# Patient Record
Sex: FEMALE | Race: WHITE | NOT HISPANIC OR LATINO | Employment: STUDENT | URBAN - METROPOLITAN AREA
[De-identification: names, ages, dates, MRNs, and addresses within clinical notes are randomized per-mention and may not be internally consistent; named-entity substitution may affect disease eponyms.]

---
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
## 2021-05-30 VITALS — WEIGHT: 110.2 LBS

## 2021-05-31 VITALS — WEIGHT: 107.3 LBS | BODY MASS INDEX: 19.75 KG/M2 | HEIGHT: 62 IN

## 2021-05-31 NOTE — PROGRESS NOTES
Family Medicine Office Visit  [facility]  Patient Name: Marie Bob  Patient Age: 19 y.o.  YOB: 2000  MRN: [id number]    Date of Visit: 2019  Reason for Office Visit:   Chief Complaint   Patient presents with     Depo Shot     late in for the next depo shot            Assessment / Plan / Medical Decision Makin. Menorrhagia with irregular cycle  Discussed different contraception options with patient. Patient interested in IUD and I agree, as patient has trouble remembering to come in for appointments/take pills on time. Depo-provera injection given as patient could not get appointment for IUD placement until September. She will schedule appointment for consult and IUD placement. Recommended starting a women's multivitamin with iron due to frequent heavy bleeding. Iron checked on  was normal and patient is not currently having any symptoms of anemia.  - Pregnancy, Urine  - medroxyPROGESTERone injection 150 mg (DEPO-PROVERA)  - Ambulatory referral to Internal Medicine      Health Maintenance Review  Health Maintenance   Topic Date Due     HIV SCREENING  2016     PREVENTIVE CARE VISIT  2018     ADVANCE CARE PLANNING  2019     INFLUENZA VACCINE RULE BASED (1) 2019     CHLAMYDIA SCREENING  2020     TD 18+ HE  2023     DTAP/TDAP/TD (7 - Td) 2023     HEPATITIS B VACCINES  Completed     IPV VACCINES  Completed     HEPATITIS A VACCINES  Completed     MMR VACCINES  Completed     VARICELLA VACCINES  Completed     MENINGOCOCCAL VACCINE  Completed     HPV VACCINES  Completed     TDAP ADULT ONE TIME DOSE  Completed         We administered medroxyPROGESTERone. We will continue to administer medroxyPROGESTERone and medroxyPROGESTERone.      HPI:  Marie Bob is a 19 y.o. year old who presents to the office today for discussion of birth control. She had been getting the Depo-Provera shot for the past year. She has still  been getting her period and spotting since starting the shot, sometimes twice per month and sometimes she does not spot or get a period at all. Her last Depo shot was in April. She missed her last shot in July. She got her period on 7/14, which lasted one week. She got her period again on 8/1, which lasted 10 days. She notes her last period was very heavy. She denies any fatigue, dizziness, shortness of breath from the frequent bleeding. Has tried oral contraceptives in the past but tends to forget to take the pill and does not like getting her period every month. She is interested in switching from the Depo-Provera shot to an IUD. No other concerns today.      Review of Systems- pertinent positive in bold:  Constitutional: Fever, chills, night sweats, fainting, weight change, fatigue, seizures, dizziness, sleeping difficulties, loud snoring/pauses in breathing  Eyes: change in vision, blurred or double vision, redness/eye pain  Ears, nose, mouth, throat: change in hearing, ear pain, hoarseness, difficulty swallowing, sores in the mouth or throat  Respiratory: shortness of breath, cough, bloody sputum, wheezing  Cardiovascular: chest pain, palpitations   Gastrointestinal: abdominal pain, heartburn/indigestion, nausea/vomiting, change in appetite, change in bowel habits, constipation or diarrhea, rectal bleeding/dark stools, difficulty swallowing  Urinary: painful urination, frequent urination, urinary urgency/incontinence, blood in urine/dark urine, nocturia  Musculoskeletal: backache/back pain (new or increasing), weakness, joint pain/stiffness (new or increasing), muscle cramps, swelling of hands, feet, ankles, leg pain/redness  Skin: change in moles/freckles, rash, nodules  Hematologic/lymphatic: swollen lymph glands, abnormal bruising/bleeding  Endocrine: excessive thirst/urination, cold or heat intolerance  Neurologic/emotional: worrisome memory change, numbness/tingling, anxiety, mood swings      Current  Scheduled Meds:  No outpatient encounter medications on file as of 8/13/2019.     Facility-Administered Encounter Medications as of 8/13/2019   Medication Dose Route Frequency Provider Last Rate Last Dose     medroxyPROGESTERone injection 150 mg (DEPO-PROVERA)  150 mg Intramuscular Q3 Months Katie Berry MD   150 mg at 08/13/19 1217     [START ON 8/14/2019] medroxyPROGESTERone injection 150 mg (DEPO-PROVERA)  150 mg Intramuscular Q3 Months Caterina Shelton PA-C         Past Medical History:   Diagnosis Date     Menorrhagia with irregular cycle      Past Surgical History:   Procedure Laterality Date     NO PAST SURGERIES       Social History     Tobacco Use     Smoking status: Never Smoker     Smokeless tobacco: Never Used     Tobacco comment: No passive exposure   Substance Use Topics     Alcohol use: No     Frequency: Never     Drug use: No       Objective / Physical Examination:  Vitals:    08/13/19 1119   BP: 104/68   Patient Site: Right Arm   Patient Position: Sitting   Cuff Size: Adult Regular   Pulse: 85   SpO2: 98%   Weight: 121 lb 3.2 oz (55 kg)     Wt Readings from Last 3 Encounters:   08/13/19 121 lb 3.2 oz (55 kg) (42 %, Z= -0.21)*   01/24/19 116 lb 14.4 oz (53 kg) (35 %, Z= -0.38)*   11/28/17 107 lb 4.8 oz (48.7 kg) (20 %, Z= -0.84)*     * Growth percentiles are based on [facility] [place] (Girls, 2-20 Years) data.     BP Readings from Last 3 Encounters:   08/13/19 104/68   01/24/19 128/84   11/28/17 106/64 (39 %, Z = -0.29 /  46 %, Z = -0.11)*     *BP percentiles are based on the August 2017 AAP Clinical Practice Guideline for girls     Body mass index is 22.02 kg/m .   Results for orders placed or performed in visit on 08/13/19   Pregnancy, Urine   Result Value Ref Range    Pregnancy Test, Urine Negative Negative       General Appearance: Alert and oriented, cooperative, affect appropriate, speech clear, in no apparent distress  Lungs: Clear to auscultation bilaterally. Normal inspiratory and expiratory  effort  Cardiovascular: Regular rate, normal S1, S2. No murmurs, rubs, or gallops  Abdomen: Bowel sounds active all four quadrants. Soft, non-tender. No hepatomegaly or splenomegaly. No bruits detected.   Neuro: Alert and oriented, follows commands appropriately.     Orders Placed This Encounter   Procedures     Pregnancy, Urine     Ambulatory referral to Internal Medicine   Followup: No follow-ups on file. earlier if needed.    Total time spent with patient was 30 minutes with >50% of time spent in face-to-face counseling regarding the above plan       Neema Chou, MS3     I, Caterina Shelton, personally performed the services described in this documentation, as written by and performed by Neema Chou in my presence, and it is both accurate and complete.   minutes

## 2021-06-02 VITALS — HEIGHT: 62 IN | BODY MASS INDEX: 21.51 KG/M2 | WEIGHT: 116.9 LBS

## 2021-06-03 VITALS — WEIGHT: 121.2 LBS | BODY MASS INDEX: 22.02 KG/M2

## 2021-06-04 VITALS
SYSTOLIC BLOOD PRESSURE: 100 MMHG | HEIGHT: 63 IN | BODY MASS INDEX: 21.97 KG/M2 | WEIGHT: 124 LBS | DIASTOLIC BLOOD PRESSURE: 50 MMHG

## 2021-06-04 VITALS — WEIGHT: 120 LBS | DIASTOLIC BLOOD PRESSURE: 66 MMHG | SYSTOLIC BLOOD PRESSURE: 102 MMHG | BODY MASS INDEX: 21.26 KG/M2

## 2021-06-05 NOTE — PROGRESS NOTES
HPI:  Marie Bob is a 19 y.o. female who is seen for   Chief Complaint   Patient presents with     Birth Control     Wants depo shot, and schedule appointment for getting IUD    Marie Bob is seen to discuss her current birth control.  She is on Depo-Provera, is a little late on her next injection, she will need to do an hCG today.  She is sexually active, has 1 steady partner, had a negative GC chlamydia test in January of last year.  She is still interested in an IUD, has an appointment order for this with Diane Trivedi, has not scheduled yet.  View of systems: Negative for palpitations, shortness of breath, menorrhagia, abdominal pain, pelvic pain, change in vaginal secretions, dysuria, cough, congestion, fever.    No results found for: HGBA1C  No results found for: GLUF, MICROALBUR, LDLCALC, CREATININE  Patient Active Problem List   Diagnosis     Paroxysmal atrial fibrillation (H)     Menorrhagia     Family History   Problem Relation Age of Onset     Atrial fibrillation Father      No Medical Problems Mother      No Medical Problems Brother      Heart disease Maternal Grandfather      Pancreatic cancer Paternal Grandmother      Stroke Paternal Grandfather      Social History     Socioeconomic History     Marital status: Single     Spouse name: Not on file     Number of children: Not on file     Years of education: Not on file     Highest education level: Not on file   Occupational History     Occupation: Student   Social Needs     Financial resource strain: Not on file     Food insecurity:     Worry: Not on file     Inability: Not on file     Transportation needs:     Medical: Not on file     Non-medical: Not on file   Tobacco Use     Smoking status: Never Smoker     Smokeless tobacco: Never Used     Tobacco comment: No passive exposure   Substance and Sexual Activity     Alcohol use: No     Frequency: Never     Drug use: No     Sexual activity: Yes     Partners: Male     Birth control/protection: Injection      Comment: one partner   Lifestyle     Physical activity:     Days per week: Not on file     Minutes per session: Not on file     Stress: Not on file   Relationships     Social connections:     Talks on phone: Not on file     Gets together: Not on file     Attends Christianity service: Not on file     Active member of club or organization: Not on file     Attends meetings of clubs or organizations: Not on file     Relationship status: Not on file     Intimate partner violence:     Fear of current or ex partner: Not on file     Emotionally abused: Not on file     Physically abused: Not on file     Forced sexual activity: Not on file   Other Topics Concern     Not on file   Social History Narrative    Lives with mom, dad, brother     Past Surgical History:   Procedure Laterality Date     NO PAST SURGERIES       No current outpatient medications on file prior to visit.     Current Facility-Administered Medications on File Prior to Visit   Medication Dose Route Frequency Provider Last Rate Last Dose     medroxyPROGESTERone injection 150 mg (DEPO-PROVERA)  150 mg Intramuscular Q3 Months Katie Berry MD   150 mg at 08/13/19 1217     medroxyPROGESTERone injection 150 mg (DEPO-PROVERA)  150 mg Intramuscular Q3 Months Caterina Shelton PA-C         No Known Allergies  OB History    No obstetric history on file.       I have reviewed the patient's medical history in detail and updated the computerized patient record.  OBJECTIVE:  Wt Readings from Last 3 Encounters:   01/13/20 124 lb (56.2 kg) (45 %, Z= -0.11)*   08/13/19 121 lb 3.2 oz (55 kg) (42 %, Z= -0.21)*   01/24/19 116 lb 14.4 oz (53 kg) (35 %, Z= -0.38)*     * Growth percentiles are based on CDC (Girls, 2-20 Years) data.     Temp Readings from Last 3 Encounters:   01/24/19 98.6  F (37  C) (Oral)   02/11/17 98.8  F (37.1  C) (Oral)   07/28/16 98.2  F (36.8  C) (Oral)     BP Readings from Last 3 Encounters:   01/13/20 100/50   08/13/19 104/68   01/24/19 128/84      Pulse Readings from Last 3 Encounters:   08/13/19 85   01/24/19 110   11/28/17 92     Body mass index is 21.97 kg/m .     Alert, cooperative, well-hydrated. Appears well.  Eyes: Pupils equal, round, reactive to light.  HEENT: Sclera white, nares patent, MMM, TM's pearly bilaterally  Lungs: Clear to auscultation. No retractions, no increased work of respiration, equal chest rise.   Heart: Regular rate and rhythm, no murmurs, clicks,   Gallops.  Abdomen: BS in 4 quadrants, no tenderness to light or deep palpation, liver and spleen are not palpably enlarged and there are no masses noted.     Labs:  No visits with results within 3 Month(s) from this visit.   Latest known visit with results is:   Office Visit on 08/13/2019   Component Date Value     Pregnancy Test, Urine 08/13/2019 Negative      ASSESMENT/PLAN:  1. Uses birth control  Beta-hCG, Quantitative    medroxyPROGESTERone injection 150 mg (DEPO-PROVERA)     Caterina Shelton, MS, PA-C 01/13/20

## 2021-06-08 NOTE — PROGRESS NOTES
Subjective:   Marie Bob is a 17 y.o. female  Roomed by: aidee    Accompanied by Mother    Refills needed? No    Do you have any forms that need to be filled out? No      Chief Complaint   Patient presents with     Fatigue     x several months, comes home from school sleeps until dinner and goes back to bed   Mother and patient say that patient has been having more fatigue over the last several months.  Says that a couple months ago patient was started on birth control pills to control her periods which used to be very heavy.  Patient says that she normally goes to sleep around 10 or 11 pm and wakes up around 6 AM.  Says that that hasn't changed, but she will come home after school and sleep from about 3-6 with her mother having to wake her up to eat dinner.  Patient denies that she is in any athletics or does any increased physical activity. Patient admits feeling like she's had a head cold for the last month.  She denies any recent fevers, chills, sore throat, chest pain, shortness of breath, cough, nausea, vomiting, belly pain, diarrhea, or feeling ill.  Patient's last bowel movement was yesterday.  Review of Systems  Const - Resp - see HPI  No Known Allergies    Current Outpatient Prescriptions:      norethindrone-ethinyl estradiol (MICROGESTIN FE 1/20, 28,) 1 mg-20 mcg (21)/75 mg (7) per tablet, Take 1 tablet by mouth daily., Disp: 28 tablet, Rfl: 5  Patient Active Problem List   Diagnosis     Paroxysmal atrial fibrillation     Medical History Reviewed  Objective:     Vitals:    02/11/17 0917   BP: 92/50   Pulse: 88   Temp: 98.8  F (37.1  C)   TempSrc: Oral   SpO2: 97%   Weight: 110 lb 3.2 oz (50 kg)   Gen - Pt in NAD  Face - non TTP over maxillary and non TTP frontal sinus areas  Nose - not congested  Pharynx - non injected, tonsils 1+ size  Neck - supple, no cervical adenopathy  Cor - RRR w/o murmur  Lungs - good air entry; no wheezes or crackles noted on auscultation - no coughing noted  Abdomen: Flat,  soft, normal BS, no HSM or masses, no rebound or guarding    Results for orders placed or performed in visit on 02/11/17   Hemoglobin   Result Value Ref Range    Hemoglobin 13.7 12.0 - 16.0 g/dL   Mononucleosis Screen   Result Value Ref Range    Mono Screen Negative Negative   Lab result discussed on day of visit.      Assessment - Plan   1. Fatigue  Discussed with mother and patient that since her symptoms have been going on for so long she really needs to follow-up with her primary physician.  Stressed to keep track of any changes in her symptoms and to keep well-hydrated.  - Hemoglobin  - Mononucleosis Screen  - Dileep-Barr Virus (EBV) Antibody Profile    At the conclusion of the encounter, assessment and plan were discussed.   All questions were answered.   The patient or guardian acknowledged understanding and was involved in the decision making regarding the overall care plan.    Patient Instructions   1. Follow up with your primary at earliest convenience  2. Keep well hydrated  3. Watch for any changes in symptoms   4. If symptoms are getting worse over time or you have any questions, call the clinic number

## 2021-06-09 NOTE — TELEPHONE ENCOUNTER
New Appointment Needed  What is the reason for the visit:    The patients mother called to schedule an appointment for her daughter for an IUD.  She states she did consult her primary a while ago.    Provider Preference: PCP only  How soon do you need to be seen?: tomorrow  Waitlist offered?: No  Okay to leave a detailed message:  Yes

## 2021-06-09 NOTE — PROGRESS NOTES
IUD Insertion Procedure Note      Indications: contraception    Procedure Details   Urine pregnancy test was done and result was negative.  The risks (including infection, bleeding, pain, and uterine perforation) and benefits of the procedure were explained to the patient and Written informed consent was obtained.      Cervix cleansed with Betadine. Uterus sounded to 8 cm. IUD inserted without difficulty. String visible and trimmed to approximately 2.5 cm. Patient tolerated procedure well.    IUD Information:  Mirena, Lot # [id number], Expiration date April 2022. NDC 58102-136-03    Condition:  Stable    Plan:    The patient was advised to call for any fever or for prolonged or severe pain or bleeding. She was advised to use NSAID as needed for mild to moderate pain.     She is given the option to return in 4 weeks for a string check

## 2021-06-14 NOTE — PROGRESS NOTES
[facility] Well Child Check    ASSESSMENT & PLAN  Marie Bob is a 17  y.o. 10  m.o. who has normal growth and normal development.    Diagnoses and all orders for this visit:    Encounter for routine child health examination without abnormal findings  -     Hearing Screening  -     Vision Screening  -     Meningococcal MCV4P  -     HPV vaccine 9 valent 2 dose IM (If started before age 15)    Dysmenorrhea  -     medroxyPROGESTERone injection 150 mg (DEPO-PROVERA); Inject 1 mL (150 mg total) into the shoulder, thigh, or buttocks every 3 (three) months.  -     Pregnancy, Urine    Paroxysmal atrial fibrillation        Return to clinic in 1 year for a Well Child Check or sooner as needed   Discussed birth control options with family. Pt would like to try depo shot. Discussed returning every 3 months for injection.  Urine pregnancy negative  Declines STD testing today  Will give HPV #2 and menactra booster today  Discussed pt's arrythemia - still unclear if has a. Fib diagnosis as it has never been captured on EKG.  If continues, recommend f/u with cardiology again to discuss further. Discussed risks of having a. Fib and possible stroke, etc.      IMMUNIZATIONS/LABS  Immunizations were reviewed and orders were placed as appropriate. and I have discussed the risks and benefits of all of the vaccine components with the patient/parents.  All questions have been answered.    REFERRALS  Dental:  Recommend routine dental care as appropriate., The patient has already established care with a dentist.  Other:  No additional referrals were made at this time.    ANTICIPATORY GUIDANCE  I have reviewed age appropriate anticipatory guidance.    HEALTH HISTORY  Do you have any concerns that you'd like to discuss today?: menstrual cycle problems - felt nauseaous on the pill. Helped to make her period regular but still bad cramps.  Stopped a few months ago because of this. Periods are back to being very irregular. Still gets intermittent  skipped heart beats every month or so but nothing sustained.        Roomed by: Franci    Accompanied by Mother    Refills needed? Yes        Do you have any significant health concerns in your family history?: No  Family History   Problem Relation Age of Onset     Atrial fibrillation Father      No Medical Problems Mother      No Medical Problems Brother      Heart disease Maternal Grandfather      Pancreatic cancer Paternal Grandmother      Stroke Paternal Grandfather      Since your last visit, have there been any major changes in your family, such as a move, job change, separation, divorce, or death in the family?: No    Home  Who lives in your home?:  Mom, dad, brother   Social History     Social History Narrative    Lives with mom, dad, brother     Do you have any trouble with sleep?:  No    Education  What school does your child attend?:  [employer]   What grade is your child in?:  11th  How does the patient perform in school (grades, behavior, attention, homework?: good - doing well    Eating  Does patient eat regular meals including fruits and vegetables?:  yes  What is the patient drinking (cow's milk, water, soda, juice, sports drinks, energy drinks, etc)?: cow's milk- skim and water  Does patient have concerns about body or appearance?:  No    Activities  Does the patient have friends?:  yes  Does the patient get at least one hour of physical activity per day?:  Sometimes   Does the patient have less than 2 hours of screen time per day (aside from homework)?:  Sometimes   What does your child do for exercise?:  Ski, walks   Does the patient have interest/participate in community activities/volunteers/school sports?:  no    MENTAL HEALTH SCREENING  PHQ-2 Total Score: 0 (11/28/2017  4:00 PM)      VISION/HEARING  Vision: Completed. See Results  Hearing:  Completed. See Results     Hearing Screening    125Hz 250Hz 500Hz 1000Hz 2000Hz 3000Hz 4000Hz 6000Hz 8000Hz   Right ear:   20 20 20  20     Left  "ear:   20 20 20  20        Visual Acuity Screening    Right eye Left eye Both eyes   Without correction: 10/10 10/10    With correction:      Comments: Plus lens- n/a      TB Risk Assessment:  The patient and/or parent/guardian answer positive to:  patient and/or parent/guardian answer 'no' to all screening TB questions    Dental  Is your child being seen by a dentist?  Yes  Flouride Varnish Application Screening  Is child seen by dentist?     Yes    Patient Active Problem List   Diagnosis     Paroxysmal atrial fibrillation       Drugs  Does the patient use tobacco/alcohol/drugs?:  no    Safety  Does the patient have any safety concerns (peer or home)?:  no  Does the patient use safety belts, helmets and other safety equipment?:  yes    Sex  Is the patient sexually active?:  Yes - uses condoms, 1 partner    MEASUREMENTS  Height:  5' 2.25\" (1.581 m)  Weight: 107 lb 4.8 oz (48.7 kg)  BMI: Body mass index is 19.47 kg/(m^2).  Blood Pressure: 106/64  Blood pressure percentiles are 34 % systolic and 44 % diastolic based on NHBPEP's 4th Report. Blood pressure percentile targets: 90: 124/80, 95: 127/84, 99 + 5 mmH/96.    PHYSICAL EXAM      Blood pressure percentiles are 34 % systolic and 44 % diastolic based on NHBPEP's 4th Report. Blood pressure percentile targets: 90: 124/80, 95: 127/84, 99 + 5 mmH/96.    Gen: Alert, awake, well appearing  Head: Normocephalic, atraumatic  Eyes: Pupils equally round and reactive to light. Conjunctivae and cornea clear  Ears: Right TM clear.  Left TM clear   Nose:  No rhinorrhea.  Throat:  Oropharynx clear.  Tonsils normal.  Neck: Supple.  No adenopathy.  Heart: Regular rate and rhythm; normal S1 and S2; no murmurs, gallops, or rubs.  Lungs: Unlabored respirations; symmetric chest expansion; clear breath sounds.  Abdomen: Soft, without organomegaly. Bowel sounds normal. Nontender without rebound. No masses palpable. No distention.  Genitalia:Normal female genitalia  Extremities: " No clubbing, cyanosis, or edema. Normal upper and lower extremities.  Skin: Normal turgor and without lesions.  Mental Status: Alert, oriented, in no distress. Appropriate for age.  Neuro: DTRs 2+, symmetric; normal tone; no focal deficits appreciated. Appropriate for age.  Spine: straight  Sports PE exam: pass

## 2021-06-16 PROBLEM — N92.0 MENORRHAGIA: Status: ACTIVE | Noted: 2019-01-25

## 2021-06-17 NOTE — PATIENT INSTRUCTIONS - HE
Patient Instructions by Caterina Shelton PA-C at 1/13/2020 11:45 AM     Author: Caterina Shelton PA-C Service: -- Author Type: Physician Assistant    Filed: 1/13/2020 11:44 AM Encounter Date: 1/13/2020 Status: Signed    : Caterina Shelton PA-C (Physician Assistant)         Patient Education     Medroxyprogesterone injection [Contraceptive]  Brand Names: Depo-Provera, Depo-subQ Provera 104  What is this medicine?  MEDROXYPROGESTERONE (me DROX ee proe ASIF te chance) contraceptive injections prevent pregnancy. They provide effective birth control for 3 months. Depo-subQ Provera 104 is also used for treating pain related to endometriosis.  How should I use this medicine?  Depo-Provera Contraceptive injection is given into a muscle. Depo-subQ Provera 104 injection is given under the skin. These injections are given by a health care professional. You must not be pregnant before getting an injection. The injection is usually given during the first 5 days after the start of a menstrual period or 6 weeks after delivery of a baby.  Talk to your pediatrician regarding the use of this medicine in children. Special care may be needed. These injections have been used in female children who have started having menstrual periods.  What side effects may I notice from receiving this medicine?  Side effects that you should report to your doctor or health care professional as soon as possible:    allergic reactions like skin rash, itching or hives, swelling of the face, lips, or tongue    breast tenderness or discharge    breathing problems    changes in vision    depression    feeling faint or lightheaded, falls    fever    pain in the abdomen, chest, groin, or leg    problems with balance, talking, walking    unusually weak or tired    yellowing of the eyes or skin  Side effects that usually do not require medical attention (report to your doctor or health care professional if they continue or are  bothersome):    acne    fluid retention and swelling    headache    irregular periods, spotting, or absent periods    temporary pain, itching, or skin reaction at site where injected    weight gain  What may interact with this medicine?  Do not take this medicine with any of the following medications:    bosentan  This medicine may also interact with the following medications:    aminoglutethimide    antibiotics or medicines for infections, especially rifampin, rifabutin, rifapentine, and griseofulvin    aprepitant    barbiturate medicines such as phenobarbital or primidone    bexarotene    carbamazepine    medicines for seizures like ethotoin, felbamate, oxcarbazepine, phenytoin, topiramate    modafinil    Tu's wort  What if I miss a dose?  Try not to miss a dose. You must get an injection once every 3 months to maintain birth control. If you cannot keep an appointment, call and reschedule it. If you wait longer than 13 weeks between Depo-Provera contraceptive injections or longer than 14 weeks between Depo-subQ Provera 104 injections, you could get pregnant. Use another method for birth control if you miss your appointment. You may also need a pregnancy test before receiving another injection.  Where should I keep my medicine?  This does not apply. The injection will be given to you by a health care professional.  What should I tell my health care provider before I take this medicine?  They need to know if you have any of these conditions:    frequently drink alcohol    asthma    blood vessel disease or a history of a blood clot in the lungs or legs    bone disease such as osteoporosis    breast cancer    diabetes    eating disorder (anorexia nervosa or bulimia)    high blood pressure    HIV infection or AIDS    kidney disease    liver disease    mental depression    migraine    seizures (convulsions)    stroke    tobacco smoker    vaginal bleeding    an unusual or allergic reaction to medroxyprogesterone,  other hormones, medicines, foods, dyes, or preservatives    pregnant or trying to get pregnant    breast-feeding  What should I watch for while using this medicine?  This drug does not protect you against HIV infection (AIDS) or other sexually transmitted diseases.  Use of this product may cause you to lose calcium from your bones. Loss of calcium may cause weak bones (osteoporosis). Only use this product for more than 2 years if other forms of birth control are not right for you. The longer you use this product for birth control the more likely you will be at risk for weak bones. Ask your health care professional how you can keep strong bones.  You may have a change in bleeding pattern or irregular periods. Many females stop having periods while taking this drug.  If you have received your injections on time, your chance of being pregnant is very low. If you think you may be pregnant, see your health care professional as soon as possible.  Tell your health care professional if you want to get pregnant within the next year. The effect of this medicine may last a long time after you get your last injection.  NOTE:This sheet is a summary. It may not cover all possible information. If you have questions about this medicine, talk to your doctor, pharmacist, or health care provider. Copyright  2018 [employer]

## 2021-06-18 NOTE — LETTER
Letter by Caterina Shelton PA-C at      Author: Caterina Shelton PA-C Service: -- Author Type: --    Filed:  Encounter Date: 1/25/2019 Status: (Other)       Marie Bob  [address] [facility] [address]             January 25, 2019         Dear Marie Rodríguezyvonne,    Below are the results from Marie's recent visit:    Resulted Orders   Beta-hCG, Qualitative, Serum   Result Value Ref Range    Beta hCG Qualitative Negative Negative   HM2(CBC w/o Differential)   Result Value Ref Range    WBC 12.0 (H) 4.0 - 11.0 thou/uL    RBC 4.92 3.80 - 5.40 mill/uL    Hemoglobin 14.2 12.0 - 16.0 g/dL    Hematocrit 42.3 35.0 - 47.0 %    MCV 86 80 - 100 fL    MCH 28.9 27.0 - 34.0 pg    MCHC 33.6 32.0 - 36.0 g/dL    RDW 12.2 11.0 - 14.5 %    Platelets 351 140 - 440 thou/uL    MPV 8.1 7.0 - 10.0 fL   Thyroid Cascade   Result Value Ref Range    TSH 1.32 0.30 - 5.00 uIU/mL       Your results are normal.    Please call with questions or contact us using [employer]Ugymt.    Sincerely,        Electronically signed by Caterina Shelton PA-C        Solaraze Counseling:  I discussed with the patient the risks of Solaraze including but not limited to erythema, scaling, itching, weeping, crusting, and pain.

## 2021-06-18 NOTE — LETTER
Letter by Caterina Shelton PA-C at      Author: Caterina Shelton PA-C Service: -- Author Type: --    Filed:  Encounter Date: 1/29/2019 Status: (Other)        Marie Bob  [address] [facility] [address]             January 29, 2019         Dear Marie Rodríguezyvonne,    Below are the results from Marie's recent visit:    Resulted Orders   Beta-hCG, Qualitative, Serum   Result Value Ref Range    Beta hCG Qualitative Negative Negative   Chlamydia trachomatis & Neisseria gonorrhoeae, Amplified Detection   Result Value Ref Range    Chlamydia trachomatis, Amplified Detection Negative Negative    Neisseria gonorrhoeae, Amplified Detection Negative Negative   HM2(CBC w/o Differential)   Result Value Ref Range    WBC 12.0 (H) 4.0 - 11.0 thou/uL    RBC 4.92 3.80 - 5.40 mill/uL    Hemoglobin 14.2 12.0 - 16.0 g/dL    Hematocrit 42.3 35.0 - 47.0 %    MCV 86 80 - 100 fL    MCH 28.9 27.0 - 34.0 pg    MCHC 33.6 32.0 - 36.0 g/dL    RDW 12.2 11.0 - 14.5 %    Platelets 351 140 - 440 thou/uL    MPV 8.1 7.0 - 10.0 fL   Thyroid Cascade   Result Value Ref Range    TSH 1.32 0.30 - 5.00 uIU/mL       Your lab results are normal.    Please call with questions or contact us using [employer].    Sincerely,        Electronically signed by Caterina Shelton PA-C

## 2021-06-19 NOTE — LETTER
Letter by Caterina Shelton PA-C at      Author: Caterina Shelton PA-C Service: -- Author Type: --    Filed:  Encounter Date: 4/27/2019 Status: (Other)        Marie Bob  [address] [facility] [address]             April 27, 2019         Dear Marie Lisbeth,    Below are the results from Marie's recent visit:    Resulted Orders   Pregnancy (Beta-hCG, Qual), Urine   Result Value Ref Range    Pregnancy Test, Urine Negative Negative    Narrative    This test is for screening purposes. Results should be interpreted along with the clinical picture. Confirmation testing is available if warranted by ordering Test 143, Beta HCG, Quantitative.       Your HCG testing was negative.    Please call with questions or contact us using [employer].    Sincerely,        Electronically signed by Caterina Shelton PA-C

## 2021-06-23 NOTE — PROGRESS NOTES
CHIEF COMPLAINT:  Chief Complaint   Patient presents with     [facility]     Contraception     Depo alternatives     Marie Bob is seen for new concern of worsening menorrhagia in the last 3 months.  She has been on Depo-Provera for about 15 months.  Has always had some light bleeding off and on through the month and now heavier bleeding.  She had tried a birth control pill before this and did not have control of her periods with the birth control pill and forgot to take it regularly.  She has had no pelvic pain.  She is sexually active but has had one partner and as far as she knows he has also only one partner.  She has no vaginal rashes, no changes in exudates, no sores, malaise.  ROS: Patient denies fever, chills, sweats, fainting, fatigue, weight change, dizziness, sleep problems, chest pain, palpitations, shortness of breath, wheezing, cough,  sore throat, changes in hearing, ear pain,tinnitus,  disphagia, sore throat, globus, changes in vision, eye pain eye redness, acid reflux, nausea, vomiting, diarrhea, constipation, black or bloody stools,  Dysuria, frequency, urinary incontinence, nocturia, hematuria, back pain,joint pain, bone pain, muscle cramps,edema, weakness, numbness, tingling of extremities, rash, itching, skin changes, swollen lymph nodes, thirst, increased urination, breast lumps, breast pain, nipple discharge, memory difficulties, anxiety, mood swings, (female)vaginal discharge, dyspareunia, menorrhagia, pelvic pain, sexual dysfunction,   (male) testicular lumps, erectile dysfunction.      PREVIOUS MEDICAL HISTORY  Past Medical History:   Diagnosis Date     Menorrhagia with irregular cycle        PREVIOUS SURGICAL HISTORY  Past Surgical History:   Procedure Laterality Date     NO PAST SURGERIES       CURRENT MEDICATIONS  No current outpatient medications on file prior to visit.     Current Facility-Administered Medications on File Prior to Visit   Medication Dose Route Frequency Provider  "Last Rate Last Dose     medroxyPROGESTERone injection 150 mg (DEPO-PROVERA)  150 mg Intramuscular Q3 Months Katie Berry MD   150 mg at 01/24/19 2777       ALLERGIES  No Known Allergies    PROBLEM LIST  Patient Active Problem List   Diagnosis     Paroxysmal atrial fibrillation (H)     Menorrhagia       SOCIAL HISTORY  [unfilled]  OBJECTIVE:  /84 (Patient Site: Right Arm, Patient Position: Sitting, Cuff Size: Adult Small)   Pulse 110   Temp 98.6  F (37  C) (Oral)   Ht 5' 2.21\" (1.58 m)   Wt 116 lb 14.4 oz (53 kg)   SpO2 99%   BMI 21.24 kg/m      General: Shows alert and oriented, well-nourished, well-developed, pleasant  male, NAD.Hydration: good  Vital Signs: Pulse 143   Temp 98.2  F (36.8  C)   Wt 28 lb (12.7 kg)   SpO2 96%   General: Appears well, in no apparent distress.   Mouth: mucous membranes moist  Neck: Soft, no adenopathy  Heart: Regular rate and rhythm, no murmurs, clicks or rubs.   Lungs:  Clear to auscultation.  Abdomen: abdomen is soft with mild diffuse tenderness, no guarding, mass, rebound or organomegaly.   Pelvis: No tenderness to palpation of pelvic area, no masses noted.  WORKUP:  Recent Results (from the past 240 hour(s))   Beta-hCG, Qualitative, Serum   Result Value Ref Range    Beta hCG Qualitative Negative Negative   HM2(CBC w/o Differential)   Result Value Ref Range    WBC 12.0 (H) 4.0 - 11.0 thou/uL    RBC 4.92 3.80 - 5.40 mill/uL    Hemoglobin 14.2 12.0 - 16.0 g/dL    Hematocrit 42.3 35.0 - 47.0 %    MCV 86 80 - 100 fL    MCH 28.9 27.0 - 34.0 pg    MCHC 33.6 32.0 - 36.0 g/dL    RDW 12.2 11.0 - 14.5 %    Platelets 351 140 - 440 thou/uL    MPV 8.1 7.0 - 10.0 fL   Thyroid Cascade   Result Value Ref Range    TSH 1.32 0.30 - 5.00 uIU/mL     None   ASSESSMENT/PLAN:  1. Menorrhagia with irregular cycle  HM2(CBC w/o Differential)    Thyroid Cascade    Beta-hCG, Qualitative, Serum    Ambulatory referral to Midwifery    US Pelvis With Transvaginal Non OB    US Pelvis With " Transvaginal Non OB    Chlamydia trachomatis & Neisseria gonorrhoeae, Amplified Detection    HM2(CBC w/o Differential)    Thyroid Cascade   Discussed patient's symptoms with Maude Ashford, midwife in our group who states that she could be seen in their clinic to add birth control pill to her current Depo-Provera and should still get her Depo-Provera today before changing birth controls.  This was given, information about midwife clinic was given to the patient.  Patient had mother present for all discussions, was given option to have mother leave and she deferred this option.  All questions were answered, they voiced understanding.    Caterina Shelton 12/11/2018 6:03 AM

## 2021-08-07 ENCOUNTER — HEALTH MAINTENANCE LETTER (OUTPATIENT)
Age: 21
End: 2021-08-07

## 2021-10-02 ENCOUNTER — HEALTH MAINTENANCE LETTER (OUTPATIENT)
Age: 21
End: 2021-10-02

## 2022-01-28 ENCOUNTER — OFFICE VISIT (OUTPATIENT)
Dept: FAMILY MEDICINE | Facility: CLINIC | Age: 22
End: 2022-01-28
Payer: COMMERCIAL

## 2022-01-28 ENCOUNTER — NURSE TRIAGE (OUTPATIENT)
Dept: NURSING | Facility: CLINIC | Age: 22
End: 2022-01-28

## 2022-01-28 VITALS
HEART RATE: 119 BPM | TEMPERATURE: 99 F | SYSTOLIC BLOOD PRESSURE: 122 MMHG | DIASTOLIC BLOOD PRESSURE: 76 MMHG | RESPIRATION RATE: 16 BRPM | WEIGHT: 122 LBS | OXYGEN SATURATION: 100 % | HEIGHT: 63 IN | BODY MASS INDEX: 21.62 KG/M2

## 2022-01-28 DIAGNOSIS — R19.7 DIARRHEA OF PRESUMED INFECTIOUS ORIGIN: Primary | ICD-10-CM

## 2022-01-28 LAB
ANION GAP SERPL CALCULATED.3IONS-SCNC: 6 MMOL/L (ref 3–14)
BASOPHILS # BLD AUTO: 0 10E3/UL (ref 0–0.2)
BASOPHILS NFR BLD AUTO: 0 %
BUN SERPL-MCNC: 7 MG/DL (ref 7–30)
CALCIUM SERPL-MCNC: 9 MG/DL (ref 8.5–10.1)
CHLORIDE BLD-SCNC: 107 MMOL/L (ref 94–109)
CO2 SERPL-SCNC: 24 MMOL/L (ref 20–32)
CREAT SERPL-MCNC: 0.54 MG/DL (ref 0.52–1.04)
EOSINOPHIL # BLD AUTO: 0 10E3/UL (ref 0–0.7)
EOSINOPHIL NFR BLD AUTO: 0 %
ERYTHROCYTE [DISTWIDTH] IN BLOOD BY AUTOMATED COUNT: 13.1 % (ref 10–15)
GFR SERPL CREATININE-BSD FRML MDRD: >90 ML/MIN/1.73M2
GLUCOSE BLD-MCNC: 95 MG/DL (ref 70–99)
HCT VFR BLD AUTO: 43.6 % (ref 35–47)
HGB BLD-MCNC: 14.5 G/DL (ref 11.7–15.7)
LYMPHOCYTES # BLD AUTO: 0.8 10E3/UL (ref 0.8–5.3)
LYMPHOCYTES NFR BLD AUTO: 7 %
MCH RBC QN AUTO: 29.8 PG (ref 26.5–33)
MCHC RBC AUTO-ENTMCNC: 33.3 G/DL (ref 31.5–36.5)
MCV RBC AUTO: 90 FL (ref 78–100)
MONOCYTES # BLD AUTO: 0.5 10E3/UL (ref 0–1.3)
MONOCYTES NFR BLD AUTO: 4 %
NEUTROPHILS # BLD AUTO: 10.8 10E3/UL (ref 1.6–8.3)
NEUTROPHILS NFR BLD AUTO: 89 %
PLATELET # BLD AUTO: 297 10E3/UL (ref 150–450)
POTASSIUM BLD-SCNC: 3.5 MMOL/L (ref 3.4–5.3)
RBC # BLD AUTO: 4.87 10E6/UL (ref 3.8–5.2)
SODIUM SERPL-SCNC: 137 MMOL/L (ref 133–144)
WBC # BLD AUTO: 12.2 10E3/UL (ref 4–11)

## 2022-01-28 PROCEDURE — 99203 OFFICE O/P NEW LOW 30 MIN: CPT | Performed by: FAMILY MEDICINE

## 2022-01-28 PROCEDURE — 80048 BASIC METABOLIC PNL TOTAL CA: CPT | Performed by: FAMILY MEDICINE

## 2022-01-28 PROCEDURE — 87177 OVA AND PARASITES SMEARS: CPT | Performed by: FAMILY MEDICINE

## 2022-01-28 PROCEDURE — 36415 COLL VENOUS BLD VENIPUNCTURE: CPT | Performed by: FAMILY MEDICINE

## 2022-01-28 PROCEDURE — 87209 SMEAR COMPLEX STAIN: CPT | Performed by: FAMILY MEDICINE

## 2022-01-28 PROCEDURE — 85025 COMPLETE CBC W/AUTO DIFF WBC: CPT | Performed by: FAMILY MEDICINE

## 2022-01-28 ASSESSMENT — PAIN SCALES - GENERAL: PAINLEVEL: MILD PAIN (2)

## 2022-01-28 ASSESSMENT — MIFFLIN-ST. JEOR: SCORE: 1279.58

## 2022-01-28 NOTE — PROGRESS NOTES
Assessment & Plan     Diarrhea of presumed infectious origin - Suspect her diarrhea is infectious in nature due to nature of onset and recent travel. Given her tachycardia and subjective weakness in clinic today, recommended that she go to [facility] for IV rehydration. She declines at this time. Ordered stool tests to determine infectious source. May prescribe antibiotics depending on result. If STEC, will not given antibiotics due to increased risk of HUS. Ordered CBC to rule out anemia and BMP to assess electrolyte status. Discussed supportive cares such as aggressive rehydration as able. Advised to go to the ED if symptoms worsen.     - Enteric Bacteria and Virus Panel by [facility] Stool; Future  - Clostridium difficile Toxin B PCR; Future  - Ova and Parasite Exam Routine; Future  - CBC with platelets and differential; Future  - Basic metabolic panel  (Ca, Cl, CO2, Creat, Gluc, K, Na, BUN); Future    30 minutes spent on the date of the encounter doing chart review, history and exam, documentation and further activities per the note       No follow-ups on file.    Diane Gan, MS3  [employer]    DO CLAUDE Flowers [facility]    Tamiko Salazar is a 22 year old who presents for the following health issues     HPI     Diarrhea  Onset/Duration: 4 days  Description:       Consistency of stool: watery, runny and mucous       Blood in stool: YES       Number of loose stools past 24 hours: 8 +  Progression of Symptoms: intermittent  Accompanying signs and symptoms:       Fever: no       Nausea/Vomiting: YES- slight nausea       Abdominal pain: YES       Weight loss: no       Episodes of constipation: no  History   Ill contacts: no  Recent use of antibiotics: no   Recent travels: YES- [place] from Friday and got back on Tues  Recent medication-new or changes(Rx or OTC): no  Precipitating or alleviating factors: None  Therapies tried and outcome: antidiarrhea med    The  "patient was recently on vacation to [place] and returned 3 days ago. On the day of her return she developed watery diarrhea with mild abdominal cramping. She attributed it to being on her period however her period resolved that night. The next 2 days she had 4-5 episodes of watery diarrhea per day, especially after she ate. Today everything got worse. She started stooling every 20 minutes with blood and mucous mixed in and associated abdominal pain. She has not been eating much the last few days and has only eaten a few crackers today. She is drinking water and some Gatorade, but believes more liquid is coming out of her than going in. She feels weak, nauseated, shakey and tired. She had trouble sleeping last night, but had no problem before that. She was travelling with one other person and they do not have symptoms. The only food she ate different than that person was some sushi. She drank bottled water only and stayed on a resort. She has never had diarrhea like this in the past. The patient is a student at the [employer]. She had a negative Covid test on Tuesday. She tried taking Imodium last night without improvement. She denies chest pain, shortness of breath, headaches, or dysuria. She reports she is still peeing regularly and it is yellow, not too dark.     Review of Systems   Constitutional, HEENT, cardiovascular, pulmonary, gi and gu systems are negative, except as otherwise noted.      Objective    /76 (BP Location: Right arm, Patient Position: Sitting, Cuff Size: Adult Regular)   Pulse 119   Temp 99  F (37.2  C) (Oral)   Resp 16   Ht 1.588 m (5' 2.5\")   Wt 55.3 kg (122 lb)   LMP 01/22/2022   SpO2 100%   BMI 21.96 kg/m    Body mass index is 21.96 kg/m .  Physical Exam   GENERAL: healthy, alert and no distress  NECK: no adenopathy, no asymmetry, masses, or scars   RESP: lungs clear to auscultation - no rales, rhonchi or wheezes  CV: regular rate and rhythm, normal S1 S2, no S3 or " S4, no murmur, click or rub, no peripheral edema and peripheral pulses strong  ABDOMEN: soft, nontender, no hepatosplenomegaly, no masses and bowel sounds reduced  MS: no gross musculoskeletal defects noted, no edema    Labs pending.    ----- Services Performed by a MEDICAL STUDENT in Presence of ATTENDING Physician-------

## 2022-01-28 NOTE — TELEPHONE ENCOUNTER
Triage Call:     Pt has been pretty sick for the last 3-4 days with diarrhea  Tuesday: ABD cramping and diarrhea, was also on period, green stool  Diarrhea every day 8x a day  Can not keep food or water down; she has diarrhea within 30 minutes of eating or drinking  No nausea or vomiting  No fever    Yesterday she was able to eat and she to an imodium, but she woke up in the middle of the night and felt nauseated. This has resolved    Stool is now is brown; but liquid    Pt recently traveled to [place] and returned on 1/25/2022    Disposition: See today in office. Pt was planning on going to the [facility] later today, but was transferred to [facility] as well. Care advice given.     Kenna Bowen RN  [facility] Nurse Advisor 10:30 AM 1/28/2022      Reason for Disposition    SEVERE diarrhea (e.g., 7 or more times / day more than normal) and present > 24 hours (1 day)    Additional Information    Negative: Shock suspected (e.g., cold/pale/clammy skin, too weak to stand, low BP, rapid pulse)    Negative: Difficult to awaken or acting confused (e.g., disoriented, slurred speech)    Negative: Sounds like a life-threatening emergency to the triager    Negative: Vomiting also present and worse than the diarrhea    Negative: Blood in stool and without diarrhea    Negative: SEVERE abdominal pain (e.g., excruciating) and present > 1 hour    Negative: SEVERE abdominal pain and age > 60    Negative: Bloody, black, or tarry bowel movements (Exception: chronic-unchanged black-grey bowel movements and is taking iron pills or Pepto-bismol)    Negative: SEVERE diarrhea (e.g., 7 or more times / day more than normal) and age > 60 years    Negative: Constant abdominal pain lasting > 2 hours    Negative: Drinking very little and has signs of dehydration (e.g., no urine > 12 hours, very dry mouth, very lightheaded)    Negative: Patient sounds very sick or weak to the triager    Protocols used: DIARRHEA-A-OH    COVID 19 Nurse Triage  Plan/Patient Instructions    Please be aware that novel coronavirus (COVID-19) may be circulating in the community. If you develop symptoms such as fever, cough, or SOB or if you have concerns about the presence of another infection including coronavirus (COVID-19), please contact your health care provider or visit [email or web address].     Disposition/Instructions    In-Person Visit with provider recommended. Reference Visit Selection Guide.    Thank you for taking steps to prevent the spread of this virus.  o Limit your contact with others.  o Wear a simple mask to cover your cough.  o Wash your hands well and often.    Resources    [facility]: About COVID-19: [email or web address]    CDC: What to Do If You're Sick: www.cdc.gov/coronavirus/2019-ncov/about/steps-when-sick.html    CDC: Ending Home Isolation: www.cdc.gov/coronavirus/2019-ncov/hcp/disposition-in-home-patients.html     CDC: Caring for Someone: www.cdc.gov/coronavirus/2019-ncov/if-you-are-sick/care-for-someone.html     [facility]: Interim Guidance for Hospital Discharge to Home: www.health.[facility].mn.[place]/diseases/coronavirus/hcp/hospdischarge.pdf    [facility] clinical trials (COVID-19 research studies): [email or web address]-clinical-trials     Below are the COVID-19 hotlines at the [place] [facility] ([facility]). Interpreters are available.   o For health questions: Call 995-066-3595 or 1-625.947.8238 (7 a.m. to 7 p.m.)  o For questions about schools and childcare: Call 533-993-2451 or 1-732.535.3215 (7 a.m. to 7 p.m.)

## 2022-01-29 ENCOUNTER — HOSPITAL ENCOUNTER (EMERGENCY)
Facility: HOSPITAL | Age: 22
Discharge: HOME | End: 2022-01-29
Admitting: PHYSICIAN ASSISTANT
Payer: COMMERCIAL

## 2022-01-29 ENCOUNTER — NURSE TRIAGE (OUTPATIENT)
Dept: NURSING | Facility: CLINIC | Age: 22
End: 2022-01-29
Payer: COMMERCIAL

## 2022-01-29 VITALS
HEART RATE: 102 BPM | TEMPERATURE: 99.1 F | WEIGHT: 122 LBS | BODY MASS INDEX: 21.96 KG/M2 | SYSTOLIC BLOOD PRESSURE: 126 MMHG | OXYGEN SATURATION: 99 % | DIASTOLIC BLOOD PRESSURE: 82 MMHG | RESPIRATION RATE: 16 BRPM

## 2022-01-29 DIAGNOSIS — R00.0 SINUS TACHYCARDIA: ICD-10-CM

## 2022-01-29 DIAGNOSIS — R19.7 DIARRHEA OF PRESUMED INFECTIOUS ORIGIN: ICD-10-CM

## 2022-01-29 LAB
ALBUMIN SERPL-MCNC: 4.7 G/DL (ref 3.5–5)
ALBUMIN UR-MCNC: 30 MG/DL
ALP SERPL-CCNC: 71 U/L (ref 45–120)
ALT SERPL W P-5'-P-CCNC: 24 U/L (ref 0–45)
ANION GAP SERPL CALCULATED.3IONS-SCNC: 17 MMOL/L (ref 5–18)
APPEARANCE UR: CLEAR
AST SERPL W P-5'-P-CCNC: 21 U/L (ref 0–40)
BILIRUB DIRECT SERPL-MCNC: 0.2 MG/DL
BILIRUB SERPL-MCNC: 0.6 MG/DL (ref 0–1)
BILIRUB UR QL STRIP: NEGATIVE
BUN SERPL-MCNC: 9 MG/DL (ref 8–22)
C DIFF TOX B STL QL: NEGATIVE
CALCIUM SERPL-MCNC: 10 MG/DL (ref 8.5–10.5)
CHLORIDE BLD-SCNC: 105 MMOL/L (ref 98–107)
CO2 SERPL-SCNC: 16 MMOL/L (ref 22–31)
COLOR UR AUTO: ABNORMAL
CREAT SERPL-MCNC: 0.73 MG/DL (ref 0.6–1.1)
ERYTHROCYTE [DISTWIDTH] IN BLOOD BY AUTOMATED COUNT: 12.2 % (ref 10–15)
FLUAV RNA SPEC QL NAA+PROBE: NEGATIVE
FLUBV RNA RESP QL NAA+PROBE: NEGATIVE
GFR SERPL CREATININE-BSD FRML MDRD: >90 ML/MIN/1.73M2
GLUCOSE BLD-MCNC: 64 MG/DL (ref 70–125)
GLUCOSE BLDC GLUCOMTR-MCNC: 108 MG/DL (ref 70–99)
GLUCOSE UR STRIP-MCNC: NEGATIVE MG/DL
HCG SERPL QL: NEGATIVE
HCT VFR BLD AUTO: 46.2 % (ref 35–47)
HGB BLD-MCNC: 15 G/DL (ref 11.7–15.7)
HGB UR QL STRIP: NEGATIVE
HOLD SPECIMEN: NORMAL
KETONES UR STRIP-MCNC: >150 MG/DL
LACTATE SERPL-SCNC: 1.3 MMOL/L (ref 0.7–2)
LEUKOCYTE ESTERASE UR QL STRIP: NEGATIVE
LIPASE SERPL-CCNC: 20 U/L (ref 0–52)
MCH RBC QN AUTO: 29.3 PG (ref 26.5–33)
MCHC RBC AUTO-ENTMCNC: 32.5 G/DL (ref 31.5–36.5)
MCV RBC AUTO: 90 FL (ref 78–100)
MUCOUS THREADS #/AREA URNS LPF: PRESENT /LPF
NITRATE UR QL: NEGATIVE
PH UR STRIP: 5.5 [PH] (ref 5–7)
PLATELET # BLD AUTO: 318 10E3/UL (ref 150–450)
POTASSIUM BLD-SCNC: 3.6 MMOL/L (ref 3.5–5)
PROT SERPL-MCNC: 8.2 G/DL (ref 6–8)
RBC # BLD AUTO: 5.12 10E6/UL (ref 3.8–5.2)
RBC URINE: <1 /HPF
SARS-COV-2 RNA RESP QL NAA+PROBE: NEGATIVE
SODIUM SERPL-SCNC: 138 MMOL/L (ref 136–145)
SP GR UR STRIP: 1.03 (ref 1–1.03)
SQUAMOUS EPITHELIAL: 2 /HPF
TROPONIN I SERPL-MCNC: <0.01 NG/ML (ref 0–0.29)
UROBILINOGEN UR STRIP-MCNC: <2 MG/DL
WBC # BLD AUTO: 12.2 10E3/UL (ref 4–11)
WBC URINE: 1 /HPF

## 2022-01-29 PROCEDURE — 84484 ASSAY OF TROPONIN QUANT: CPT | Performed by: PHYSICIAN ASSISTANT

## 2022-01-29 PROCEDURE — 85027 COMPLETE CBC AUTOMATED: CPT | Performed by: PHYSICIAN ASSISTANT

## 2022-01-29 PROCEDURE — 96375 TX/PRO/DX INJ NEW DRUG ADDON: CPT

## 2022-01-29 PROCEDURE — 36415 COLL VENOUS BLD VENIPUNCTURE: CPT | Performed by: PHYSICIAN ASSISTANT

## 2022-01-29 PROCEDURE — 258N000003 HC RX IP 258 OP 636: Performed by: PHYSICIAN ASSISTANT

## 2022-01-29 PROCEDURE — 250N000011 HC RX IP 250 OP 636: Performed by: PHYSICIAN ASSISTANT

## 2022-01-29 PROCEDURE — 81001 URINALYSIS AUTO W/SCOPE: CPT | Performed by: PHYSICIAN ASSISTANT

## 2022-01-29 PROCEDURE — 82248 BILIRUBIN DIRECT: CPT | Performed by: PHYSICIAN ASSISTANT

## 2022-01-29 PROCEDURE — 83690 ASSAY OF LIPASE: CPT | Performed by: PHYSICIAN ASSISTANT

## 2022-01-29 PROCEDURE — 96361 HYDRATE IV INFUSION ADD-ON: CPT

## 2022-01-29 PROCEDURE — 80053 COMPREHEN METABOLIC PANEL: CPT | Performed by: PHYSICIAN ASSISTANT

## 2022-01-29 PROCEDURE — 93005 ELECTROCARDIOGRAM TRACING: CPT | Performed by: PHYSICIAN ASSISTANT

## 2022-01-29 PROCEDURE — 83605 ASSAY OF LACTIC ACID: CPT | Performed by: PHYSICIAN ASSISTANT

## 2022-01-29 PROCEDURE — 84703 CHORIONIC GONADOTROPIN ASSAY: CPT | Performed by: PHYSICIAN ASSISTANT

## 2022-01-29 PROCEDURE — 87636 SARSCOV2 & INF A&B AMP PRB: CPT | Performed by: PHYSICIAN ASSISTANT

## 2022-01-29 PROCEDURE — 96374 THER/PROPH/DIAG INJ IV PUSH: CPT

## 2022-01-29 PROCEDURE — 87493 C DIFF AMPLIFIED PROBE: CPT | Performed by: PHYSICIAN ASSISTANT

## 2022-01-29 PROCEDURE — 99284 EMERGENCY DEPT VISIT MOD MDM: CPT | Mod: 25

## 2022-01-29 RX ORDER — ONDANSETRON 4 MG/1
4 TABLET, ORALLY DISINTEGRATING ORAL EVERY 8 HOURS PRN
Qty: 10 TABLET | Refills: 0 | Status: SHIPPED | OUTPATIENT
Start: 2022-01-29

## 2022-01-29 RX ORDER — ONDANSETRON 2 MG/ML
4 INJECTION INTRAMUSCULAR; INTRAVENOUS ONCE
Status: COMPLETED | OUTPATIENT
Start: 2022-01-29 | End: 2022-01-29

## 2022-01-29 RX ORDER — KETOROLAC TROMETHAMINE 30 MG/ML
15 INJECTION, SOLUTION INTRAMUSCULAR; INTRAVENOUS ONCE
Status: COMPLETED | OUTPATIENT
Start: 2022-01-29 | End: 2022-01-29

## 2022-01-29 RX ADMIN — SODIUM CHLORIDE, POTASSIUM CHLORIDE, SODIUM LACTATE AND CALCIUM CHLORIDE 1000 ML: 600; 310; 30; 20 INJECTION, SOLUTION INTRAVENOUS at 10:06

## 2022-01-29 RX ADMIN — ONDANSETRON 4 MG: 2 INJECTION INTRAMUSCULAR; INTRAVENOUS at 10:19

## 2022-01-29 RX ADMIN — SODIUM CHLORIDE 1000 ML: 9 INJECTION, SOLUTION INTRAVENOUS at 12:25

## 2022-01-29 RX ADMIN — KETOROLAC TROMETHAMINE 15 MG: 30 INJECTION, SOLUTION INTRAMUSCULAR at 10:21

## 2022-01-29 ASSESSMENT — ENCOUNTER SYMPTOMS
BLOOD IN STOOL: 1
HEMATURIA: 0
FLANK PAIN: 0
DIZZINESS: 0
DYSURIA: 0
ABDOMINAL PAIN: 1
BACK PAIN: 1
COUGH: 0
VOMITING: 0
DIARRHEA: 1
FEVER: 0
NAUSEA: 1
SHORTNESS OF BREATH: 0
CHILLS: 0

## 2022-01-29 NOTE — ED PROVIDER NOTES
Emergency Department Encounter   NAME: Marie Bob ; AGE: 22 year old female ; YOB: 2000 ; MRN: [id number] ; PCP: Caterina Shelton (Inactive)   ED PROVIDER: Linnea Ohara PA-C    Evaluation Date & Time:   No admission date for patient encounter.    CHIEF COMPLAINT:  Diarrhea      Impression and Plan   MDM: Marie Bob is a 22 year old female with a pertinent history of paroxysmal atrial fibrillation and menorrhagia, who presents to the ED by self for evaluation of diarrhea.  Patient presents to the emergency department for evaluation of 4 days of watery diarrhea, no bright red blood in the stool yesterday, and cramping abdominal pain prior to having a bowel movement.  Symptoms started after a trip to [place], where patient ate at the [employer] bar.  Here in the ED, she is afebrile and vitally stable though is tachycardic to the 120s.  She does appear clinically dry with dry mucous membranes.  She does not have any abdominal pain outside of eating or bowel movements and currently denies any pain.  Her abdomen is soft with normal bowel sounds and there is no rebound, guarding, or evidence of peritonitis or even reproducible tenderness.  This is quite reassuring, and I have low suspicion for any acute emergent or surgical pathology in the abdomen or pelvis.  We discussed plan to establish IV, a liter of lactated Ringer's along with Zofran and Toradol for symptomatic relief ordered.  Will further assess with blood work, urine, and EKG.  Differential at this time is broad and includes but is not limited to viral illness, dehydration, metabolic derangements,  COVID-19, Campylobacter, E. coli diarrhea, giardia, salmonella, shigella, IBS, IBD, and others.    Patient has a history of paroxysmal atrial fibrillation listed in her chart.  Due to this and her tachycardia, obtained EKG which confirms a sinus tachycardia without pathologic arrhythmia.  She does have T wave inversions in her inferior leads  and V3 which are likely nonspecific.  No previous EKGs from comparison.  She is not having any cardiac symptoms and troponin is negative.  No concern for acute ischemia or myocarditis.  No pain or proportion to exam or elevated lactate to suggest ischemic colitis.  COVID-19 and influenza swabs negative.  Urine without signs of infection.  Pregnancy test negative.  LFTs, alk phos, bilirubin, lipase are all within normal limits -no concern for hepatobiliary etiology of her symptoms.  No concerning metabolic derangements or abnormal kidney function.  She was hypoglycemic with a blood sugar of 64 likely due to her poor oral intake.  After Zofran, she has been able to eat crackers, sandwich, consume juices with improvement of her sugars into the 100s.  She was eventually able to provide a small stool sample which was sent for enteric bacteria and culture.  Unfortunately, I do not think it will be enough for C. difficile, though my suspicion is lower for this.  Her heart rate is now down to 100, and she continues to look well, eating and drinking appropriately, repeat abdominal exam benign.  She would like to go home and is feeling significantly improved which is reassuring.  It also took her several hours to provide a small amount of stool which is also reassuring that her diarrhea is improving.  Hemoglobin and hematocrit are within normal limits, bloody stools have resolved, no concern for life-threatening GI bleed.  Suspect likely hemorrhoidal.  She does have a mild leukocytosis of 12.2 likely due to her infectious diarrhea, however not trending upwards.  No concern at this time for appendicitis, diverticulitis, or intra-abdominal abscess.  She has stool collection supplies at home to obtain her c. Diff. sample and bring this back to the lab.  Discharged with Zofran and supportive measures for home.  Will hold off on Imodium given concern for possible E. coli infection. discharged in good condition with strict return  precautions.     ED COURSE:  ED Course as of 01/29/22 1429   Sat Jan 29, 2022   0946 Attempted to see the patient in the waiting room - no private spaces to gather history or perform exam. Introduced myself and will recheck back.    1030 Met with and examined the patient around 10. Orders placed.    1156 Rechecked the patient - she states that she is feeling improved. Still tachy - young and healthy - will order another 1L of NS. Still needs to provide urine and stool samples.    1252 Rechecked the patient - heart rate down to 103. She feels well. Still trying to produce a stool sample which is a good sign. Will give her something to eat.    1425 Patient was able to provide a small amount for stool sample - sent to the lab and enteric pathogens/culture in process. HR down to 100 BPM while in the room. She has been eating and drinking in the ED without difficulty. Repeat abdominal exam benign    She feels significantly improved and is comfortable with discharge while awaiting culture results.       At the conclusion of the encounter I discussed the results of all the tests and the disposition. The questions were answered. The patient or family acknowledged understanding and was agreeable with the care plan.    FINAL IMPRESSION:    ICD-10-CM    1. Diarrhea of presumed infectious origin  R19.7    2. Sinus tachycardia  R00.0          MEDICATIONS GIVEN IN THE EMERGENCY DEPARTMENT:  Medications   lactated ringers BOLUS 1,000 mL (0 mLs Intravenous Stopped 1/29/22 1130)   ondansetron (ZOFRAN) injection 4 mg (4 mg Intravenous Given 1/29/22 1019)   ketorolac (TORADOL) injection 15 mg (15 mg Intravenous Given 1/29/22 1021)   0.9% sodium chloride BOLUS (0 mLs Intravenous Stopped 1/29/22 1253)         NEW PRESCRIPTIONS STARTED AT TODAY'S ED VISIT:  New Prescriptions    ONDANSETRON (ZOFRAN ODT) 4 MG ODT TAB    Take 1 tablet (4 mg) by mouth every 8 hours as needed for nausea         HPI   Patient information was obtained from:  Patient    Use of Intrepreter: N/A     Marie Bob is a 22 year old female with a pertinent history of paroxysmal atrial fibrillation and menorrhagia, who presents to the ED by self for evaluation of diarrhea.     The patient went on a trip to [place] left last Friday on 1/21.  On her last day of the trip, Tuesday the 25th, she began experiencing several episodes of watery diarrhea and abdominal cramping.  Her symptoms felt better on Wednesday when she returned, though persistently has been experiencing watery diarrhea.  Yesterday, she was having bowel movements every 30 minutes which were watery in nature.  She had several bowel movements where there was also some bright red blood in the toilet bowl.  No clots.  She visited urgent care yesterday, however was unable to produce enough stool for stool culture.  She presents to the ED today due to concerns for dehydration.  She has had a poor appetite throughout this illness as anytime she eats she experiences abdominal cramping.  Her abdominal pain only presents before bowel movements or after eating.  She currently denies pain at this time.  She has not had a fever throughout this illness, however has been sweaty before bowel movements.  Slightly nauseated though no vomiting.  No previous abdominal surgeries.  Her last menstrual period was 1 week ago.  She denies any chronic health conditions and takes no daily medicines.  No recent alcohol or drug use.  She was on the trip with 1 other individual who is not ill with similar symptoms, however she ate raw sushi and her acquaintance did not.    REVIEW OF SYSTEMS:  Review of Systems   Constitutional: Negative for chills and fever.   Respiratory: Negative for cough and shortness of breath.    Cardiovascular: Negative for chest pain.   Gastrointestinal: Positive for abdominal pain, blood in stool, diarrhea and nausea. Negative for vomiting.   Genitourinary: Negative for dysuria, flank pain and hematuria.    Musculoskeletal: Positive for back pain.   Neurological: Negative for dizziness and syncope.   All other systems reviewed and are negative.        Medical History     Past Medical History:   Diagnosis Date     Menorrhagia with irregular cycle      NO ACTIVE PROBLEMS        Past Surgical History:   Procedure Laterality Date     NO HISTORY OF SURGERY       NO PAST SURGERIES         Family History   Problem Relation Age of Onset     Atrial fibrillation Father      No Known Problems Mother      No Known Problems Brother      Heart Disease Maternal Grandfather      Pancreatic Cancer Paternal Grandmother      Cerebrovascular Disease Paternal Grandfather        Social History     Tobacco Use     Smoking status: Never Smoker     Smokeless tobacco: Never Used     Tobacco comment: No passive exposure   Substance Use Topics     Alcohol use: No     Drug use: No       ondansetron (ZOFRAN ODT) 4 MG ODT tab          Physical Exam     First Vitals:  Patient Vitals for the past 24 hrs:   BP Temp Temp src Pulse Resp SpO2 Weight   01/29/22 1400 127/77 -- -- (!) 123 -- 100 % --   01/29/22 1345 121/69 -- -- 115 -- 100 % --   01/29/22 1330 120/72 -- -- 108 -- 100 % --   01/29/22 1323 130/78 -- -- 112 -- 99 % --   01/29/22 1230 116/67 -- -- 103 16 100 % --   01/29/22 0926 (!) 140/96 99.1  F (37.3  C) Oral (!) 129 20 99 % 55.3 kg (122 lb)         PHYSICAL EXAM:   Physical Exam  Vitals and nursing note reviewed.   Constitutional:       General: She is not in acute distress.     Appearance: Normal appearance. She is not toxic-appearing.   HENT:      Head: Normocephalic.      Mouth/Throat:      Mouth: Mucous membranes are dry.   Eyes:      Conjunctiva/sclera: Conjunctivae normal.   Cardiovascular:      Rate and Rhythm: Regular rhythm. Tachycardia present.      Pulses: Normal pulses.      Heart sounds: Normal heart sounds.   Pulmonary:      Effort: Pulmonary effort is normal.      Breath sounds: Normal breath sounds.   Abdominal:       General: Abdomen is flat. Bowel sounds are normal. There is no distension.      Palpations: Abdomen is soft.      Tenderness: There is no abdominal tenderness. There is no right CVA tenderness, left CVA tenderness, guarding or rebound.   Skin:     General: Skin is warm and dry.      Capillary Refill: Capillary refill takes less than 2 seconds.   Neurological:      Mental Status: She is alert.             Results     LAB:  All pertinent labs reviewed and interpreted  Labs Ordered and Resulted from Time of ED Arrival to Time of ED Departure   CBC WITH PLATELETS - Abnormal       Result Value    WBC Count 12.2 (*)     RBC Count 5.12      Hemoglobin 15.0      Hematocrit 46.2      MCV 90      MCH 29.3      MCHC 32.5      RDW 12.2      Platelet Count 318     BASIC METABOLIC PANEL - Abnormal    Sodium 138      Potassium 3.6      Chloride 105      Carbon Dioxide (CO2) 16 (*)     Anion Gap 17      Urea Nitrogen 9      Creatinine 0.73      Calcium 10.0      Glucose 64 (*)     GFR Estimate >90     HEPATIC FUNCTION PANEL - Abnormal    Bilirubin Total 0.6      Bilirubin Direct 0.2      Protein Total 8.2 (*)     Albumin 4.7      Alkaline Phosphatase 71      AST 21      ALT 24     UA MACROSCOPIC WITH REFLEX TO MICRO AND CULTURE - Abnormal    Color Urine Light Yellow      Appearance Urine Clear      Glucose Urine Negative      Bilirubin Urine Negative      Ketones Urine >150 (*)     Specific Gravity Urine 1.027      Blood Urine Negative      pH Urine 5.5      Protein Albumin Urine 30  (*)     Urobilinogen Urine <2.0      Nitrite Urine Negative      Leukocyte Esterase Urine Negative      Mucus Urine Present (*)     RBC Urine <1      WBC Urine 1      Squamous Epithelials Urine 2 (*)    GLUCOSE BY METER - Abnormal    GLUCOSE BY METER POCT 108 (*)    LACTIC ACID WHOLE BLOOD - Normal    Lactic Acid 1.3     LIPASE - Normal    Lipase 20     HCG QUALITATIVE PREGNANCY - Normal    hCG Serum Qualitative Negative     INFLUENZA A/B & SARS-COV2  PCR MULTIPLEX - Normal    Influenza A PCR Negative      Influenza B PCR Negative      SARS CoV2 PCR Negative     TROPONIN I - Normal    Troponin I <0.01     GLUCOSE MONITOR NURSING POCT   ENTERIC BACTERIA AND VIRUS PANEL BY FELICITY STOOL   CLOSTRIDIUM DIFFICILE TOXIN B       RADIOLOGY:  No orders to display          EKG Interpretation:      EKG Number: 1  Interpreted by Linnea Ohara PA-C  Symptoms at time of EKG: Tachycardia    Rhythm: Normal sinus  and Sinus tachycardia  Rate: 110-120  Axis: Normal  Ectopy: None  Conduction: Normal  ST Segments/ T Waves:Nonspecific T wave inversions in lead II, lead III, AVF, and V3.   Q Waves: None  Comparison to prior: No old EKG available    Clinical Impression: non-specific EKG        Linnea Ohara PA-C   Emergency Medicine   [facility] EMERGENCY DEPARTMENT       Linnea Ohara PA-C  01/29/22 8318

## 2022-01-29 NOTE — ED NOTES
MRN:[id number]                      After Visit Summary   3/30/2017    Serena Cunningham    MRN: [id number]           Visit Information        Provider Department      3/30/2017 5:30 PM [facility] Generic      Your next 10 appointments already scheduled     Apr 04, 2017  4:00 PM CDT   [facility] with [facility]    [address] [id number] [phone]           Patient must bring picture ID.  Patient should be prepared to give a urine specimen  Please do not eat 10-12 hours before your appointment if you are coming in fasting for labs on lipids, cholesterol, or glucose (sugar).  Pregnant women should follow their Care Team instructions. Water with medications is okay. Do not drink coffee or other fluids.   If you have concerns about taking  your medications, please ask at office or if scheduling via [employer], send a message by clicking on Secure Messaging, Message Your Care Team.            Apr 12, 2017  5:30 PM CDT   Return Visit with [facility]  [address] [place] [id number]   [phone]            Apr 20, 2017  6:30 PM CDT   Return Visit with [facility]  [address] [place] [id number]   [phone]            Apr 26, 2017  4:15 PM CDT   Office Visit with Whitney Rob DO, [facility]   [facility]    [address] [place] [id number] [phone]           Bring a current list of meds and any records pertaining to this visit.  For Physicals, please bring immunization records and any forms needing to be filled out.  Please arrive 10 minutes early to  Patient given crackers and juice for BG 64.   complete paperwork.            Apr 27, 2017  5:30 PM CDT   Return Visit with AMANDA Wagoner   [facility]    [place] [facility]  [address]  [facility] [id number] [phone]              MyChart Information     [employer] gives you secure access to your electronic health record. If you see a primary care provider, you can also send messages to your care team and make appointments. If you have questions, please call your primary care clinic.  If you do not have a primary care provider, please call [phone] and they will assist you.        Care EveryWhere ID     This is your Care EveryWhere ID. This could be used by other organizations to access your [place] medical records  [id number]

## 2022-01-29 NOTE — TELEPHONE ENCOUNTER
Patient calfling today. She reports she was seen yesterday by MD after returning home on Tuesday with GI  Issues,  Diarrhea, abdominal cramping , and nausea.  Patient has  supplied one stool culture , which is still processing, but there are others ordered by MD yesterday.  Patient reports;   Stomach cramps throughout the night.  Felt feverish yesterday.temp was 99.9 at OV    She reports feeling shaky , light headedness.    No headache, but feeling really shaky and weak and tired, States it is very hard to even walk to the bathroom. She reports , stomach pains are intense when she tries to drink anything , or eat anything. She reports maybe she has had a few ounces since getting back from her doctor's office visit yesterday.at 7 pm last night.    Patient advised to go to the ER now at [facility]. As she sounds dehydrated and weak.    Patient agreed to go to ER now.    Reason for Disposition    Nursing judgment    Protocols used: NO GUIDELINE OR REFERENCE [id number]

## 2022-01-29 NOTE — DISCHARGE INSTRUCTIONS
As we discussed, it is likely that you have an infectious diarrhea.  Please obtain home samples and keep refrigerated until returning to either [facility] or the urgent care to have the testing completed.  Please avoid any Imodium at this time.  Stay well-hydrated and drink plenty of fluids that are electrolyte rich such as Pedialyte or Pedia sure.  If at anytime you have concerns of dehydration, worsening bloody stools, worsening abdominal pain, dizziness or near loss of consciousness, or any new or concerning symptoms please return to the ER for further evaluation.

## 2022-01-29 NOTE — ED TRIAGE NOTES
Patient with diarrhea since 1/25. Patient was in [place] and flew back on the day she began to have diarrhea. Was seen at PCP, but was unable to give enough of a sample for them to do all stool cultures. Patient states diarrhea has gotten worse in the last 24 hours and has begun to have abdominal pain. Patient noticed blood in her stool yesterday, but that has stopped.

## 2022-01-30 LAB
ATRIAL RATE - MUSE: 113 BPM
DIASTOLIC BLOOD PRESSURE - MUSE: NORMAL MMHG
INTERPRETATION ECG - MUSE: NORMAL
P AXIS - MUSE: 74 DEGREES
PR INTERVAL - MUSE: 118 MS
QRS DURATION - MUSE: 66 MS
QT - MUSE: 296 MS
QTC - MUSE: 406 MS
R AXIS - MUSE: 72 DEGREES
SYSTOLIC BLOOD PRESSURE - MUSE: NORMAL MMHG
T AXIS - MUSE: -44 DEGREES
VENTRICULAR RATE- MUSE: 113 BPM

## 2022-01-30 PROCEDURE — 87506 IADNA-DNA/RNA PROBE TQ 6-11: CPT | Performed by: FAMILY MEDICINE

## 2022-01-30 PROCEDURE — 87493 C DIFF AMPLIFIED PROBE: CPT | Mod: 59 | Performed by: FAMILY MEDICINE

## 2022-01-31 ENCOUNTER — APPOINTMENT (OUTPATIENT)
Dept: LAB | Facility: CLINIC | Age: 22
End: 2022-01-31
Payer: COMMERCIAL

## 2022-01-31 ENCOUNTER — TELEPHONE (OUTPATIENT)
Dept: NURSING | Facility: CLINIC | Age: 22
End: 2022-01-31
Payer: COMMERCIAL

## 2022-01-31 LAB — C DIFF TOX B STL QL: NEGATIVE

## 2022-01-31 NOTE — TELEPHONE ENCOUNTER
Pt calling to verify if letter was sent to her mychart in regards to having this week off. I did mention that she reached the [facility] resulting dept, went ahead informed her letter has been generated should be in her mychart. She could not locate the letter on mychart, and said she's in her MH mychart, mychart # given for further assistance.

## 2022-01-31 NOTE — TELEPHONE ENCOUNTER
Left voicemail for patient relaying message from the provider below. Patient to call back with any further questions or concerns.     Savannah Maciel RN 01/31/22 10:07 AM   [facility] Triage Nurse Advisor

## 2022-01-31 NOTE — TELEPHONE ENCOUNTER
Please let the patient know I have written her a school and work note that keeps her out all this week.  I will watch for the results of her stool studies.  If she feels better and able to work prior to that she can let me know and I can send a note.    Love Escalante DO

## 2022-01-31 NOTE — LETTER
[facility]  [address] [address]  Phone: [phone]    January 31, 2022        Marie Bob  [address] [place] [address]          To whom it may concern:    RE: Marie Bob    Patient was seen and treated at our clinic and missed school and work 1/31/2022 -- 2/04/2022.     Please contact me for questions or concerns.      Sincerely,      Love Escalante D.O.

## 2022-01-31 NOTE — TELEPHONE ENCOUNTER
Patient calling requesting a note from her provider dismissing her from school and work. Reporting she has ongoing diarrhea and has not been able to leave her bathroom. Patient seen in clinic for this 1/28. Requesting note be sent through [employer].     Savannah Maciel RN 01/31/22 8:48 AM    Health Triage Nurse Advisor

## 2022-02-01 ENCOUNTER — NURSE TRIAGE (OUTPATIENT)
Dept: NURSING | Facility: CLINIC | Age: 22
End: 2022-02-01
Payer: COMMERCIAL

## 2022-02-01 LAB
O+P STL MICRO: NEGATIVE
TRI STN SPEC: NORMAL
TRI STN SPEC: NORMAL

## 2022-02-01 NOTE — TELEPHONE ENCOUNTER
Routing to Dr. 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

## 2022-02-01 NOTE — TELEPHONE ENCOUNTER
Please let the patient know sugar toxin is known for causing bloody diarrhea.  The treatment is IV fluids.  Antibiotics are not recommended because they can sometimes cause kidney problems.  She should continue to improve as time goes on.  She should continue to try to eat small frequent light meals and push fluids    Love Escalante DO

## 2022-02-01 NOTE — TELEPHONE ENCOUNTER
Called and reviewed providers recommendations with pt. She verbalized an understanding and will continue with BRAT diet with small light meals and increase fluid/water intake.    Jeaneth Mcelroy RN

## 2022-02-01 NOTE — TELEPHONE ENCOUNTER
Nurse Triage SBAR    Is this a 2nd Level Triage? NO    Situation    : Follow up on lab results on 1/28 and 1/29. Positive for Shiga toxin 1  Ongoing mild diarrhea     Background    : Pt traveled to [place] 2 weeks ago, had raw sushi.  Diarrhea started on 1/25  Constant abdominal cramping and blood in stools on 1/28 which prompted her to go to ED    Assessment   : On and off abdominal pain which clears up after passing BM  Diarrhea - 3 episodes per day since Sunday. Has slightly improved from 1/29 as she started on BRAT diet.  Still has decreased appetite, forces fluids for hydration  Fatigue  No fever.        (See information below for more triage details.)    Recommendation   : Routing to provider for recommendation.    Protocol Recommended Disposition: Paged/Called Provider     PCP is Cat PERALTA but has not seen her since 2020. Pt recently seen Dr. Escalante for diarrhea concern.    In case provider calls in medications, pt uses [facility] in [place] and [facility] in [place].    Lizzie Rowley RN/[facility] Nurse Advisor        Reason for Disposition    Travel to a foreign country in past month    Additional Information    Negative: Shock suspected (e.g., cold/pale/clammy skin, too weak to stand, low BP, rapid pulse)    Negative: Difficult to awaken or acting confused (e.g., disoriented, slurred speech)    Negative: Sounds like a life-threatening emergency to the triager    Negative: SEVERE abdominal pain (e.g., excruciating) and present > 1 hour    Negative: SEVERE abdominal pain and age > 60    Negative: Bloody, black, or tarry bowel movements (Exception: chronic-unchanged black-grey bowel movements and is taking iron pills or Pepto-bismol)    Negative: SEVERE diarrhea (e.g., 7 or more times / day more than normal) and age > 60 years    Negative: Constant abdominal pain lasting > 2 hours    Negative: Drinking very little and has signs of dehydration (e.g., no urine > 12 hours, very dry mouth, very lightheaded)     Negative: Patient sounds very sick or weak to the triager    Negative: SEVERE diarrhea (e.g., 7 or more times / day more than normal) and present > 24 hours (1 day)    Negative: MODERATE diarrhea (e.g., 4-6 times / day more than normal) and age > 70 years    Negative: MODERATE diarrhea (e.g., 4-6 times / day more than normal) and present > 48 hours (2 days)    Negative: Abdominal pain  (Exception: pain clears completely with each passage of diarrhea stool)    Negative: Fever > 101 F (38.3 C)    Negative: Blood in the stool    Negative: Mucus or pus in stool has been present > 2 days and diarrhea is more than mild    Negative: Weak immune system (e.g., HIV positive, cancer chemo, splenectomy, organ transplant, chronic steroids)    Protocols used: DIARRHEA-A-OH

## 2022-02-02 NOTE — TELEPHONE ENCOUNTER
RN left  for patient at [phone] requesting call back to clinic.    RN called to get update on patient and see if she is improving    Feliciano Blackmon RN, BSN, PHN  [facility]

## 2022-02-02 NOTE — TELEPHONE ENCOUNTER
Pt is calling.    Diarrhea, abdominal cramping started last Tuesday after travelling to [place].  Was seen in clinic on 01/28/2022 and the ED on Saturday.  Shigella infection. Told that there is no treatment, and it will get better with time.  Today, she feels worse. Feels more weak. Increase diarrhea 5 times so far today. Increase intestinal cramping off and on. Gets sweaty due to the cramping and diarrhea.  Denies nausea, vomiting, fever.  Unable to drink water even, as it causes more diarrhea and cramping.  Unable to get off the toilet at times.  She has been sipping water, ice chips.  Urinates every time she has the diarrhea. No longer has blood in her stool the past 2 days. Mouth is not dry.  Pt is only able to eat small amounts.    Care advice reviewed. I advised her to try to switch to watered down Gatorade, or propel with electrolytes, popsicles.  Does not want to eat or drink due to the cramping pain and diarrhea.  I advised her that if she is feeling weaker or pain is severe, then she should be seen in the ED.  Advised to start on Probiotics as well.  I advised her to call the clinic tomorrow AM if still not feeling better, and if she feels she need IV fluids, as they may be able to set up an appointment at the [facility] for her to try to avoid the ED.  She verbalized understanding and will call back tomorrow if still not feeling better.        COVID 19 Nurse Triage Plan/Patient Instructions    Please be aware that novel coronavirus (COVID-19) may be circulating in the community. If you develop symptoms such as fever, cough, or SOB or if you have concerns about the presence of another infection including coronavirus (COVID-19), please contact your health care provider or visit [email or web address].     Disposition/Instructions    In-Person Visit with provider recommended. Reference Visit Selection Guide.    Thank you for taking steps to prevent the spread of this virus.  o Limit your contact with  others.  o Wear a simple mask to cover your cough.  o Wash your hands well and often.    Resources    [facility]: About COVID-19: [email or web address]    CDC: What to Do If You're Sick: www.cdc.gov/coronavirus/2019-ncov/about/steps-when-sick.html    CDC: Ending Home Isolation: www.cdc.gov/coronavirus/2019-ncov/hcp/disposition-in-home-patients.html     CDC: Caring for Someone: www.cdc.gov/coronavirus/2019-ncov/if-you-are-sick/care-for-someone.html     [facility]: Interim Guidance for Hospital Discharge to Home: www.health.[facility].[place]/diseases/coronavirus/hcp/hospdischarge.pdf    [facility] clinical trials (COVID-19 research studies): [email or web address]     Below are the COVID-19 hotlines at the [place] [facility] ([facility]). Interpreters are available.   o For health questions: Call 064-551-6656 or 1-786.723.1634 (7 a.m. to 7 p.m.)  o For questions about schools and childcare: Call 595-633-7153 or 1-790.222.1812 (7 a.m. to 7 p.m.)   o   Reason for Disposition    [1] MODERATE diarrhea (e.g., 4-6 times / day more than normal) AND [2] present > 48 hours (2 days)    Additional Information    Negative: Shock suspected (e.g., cold/pale/clammy skin, too weak to stand, low BP, rapid pulse)    Negative: Difficult to awaken or acting confused (e.g., disoriented, slurred speech)    Negative: Sounds like a life-threatening emergency to the triager    Negative: Vomiting also present and worse than the diarrhea    Negative: [1] Blood in stool AND [2] without diarrhea    Negative: Diarrhea in a cancer patient who is currently (or recently) receiving chemotherapy or radiation therapy, or cancer patient who has metastatic or end-stage cancer and is receiving palliative care    Negative: [1] SEVERE abdominal pain AND [2] age > 60    Negative: [1] Blood in the stool AND [2] moderate or large amount of blood    Negative: Black or tarry bowel movements  (Exception:  chronic-unchanged  black-grey bowel movements AND is taking iron pills or Pepto-bismol)    Negative: [1] SEVERE abdominal pain (e.g., excruciating) AND [2] present > 1 hour    Negative: [1] Drinking very little AND [2] dehydration suspected (e.g., no urine > 12 hours, very dry mouth, very lightheaded)    Negative: Patient sounds very sick or weak to the triager    Negative: [1] SEVERE diarrhea (e.g., 7 or more times / day more than normal) AND [2] age > 60 years    Negative: [1] Constant abdominal pain AND [2] present > 2 hours    Negative: [1] Fever > 103 F (39.4 C) AND [2] not able to get the fever down using Fever Care Advice    Negative: [1] SEVERE diarrhea (e.g., 7 or more times / day more than normal) AND [2] present > 24 hours (1 day)    Protocols used: DIARRHEA-A-    Ina Archuleta RN  [facility] Nurse Advisor  2/1/2022 at 6:37 PM

## 2022-02-03 ENCOUNTER — NURSE TRIAGE (OUTPATIENT)
Dept: NURSING | Facility: CLINIC | Age: 22
End: 2022-02-03
Payer: COMMERCIAL

## 2022-02-03 NOTE — TELEPHONE ENCOUNTER
RN triage   Call from pt   Pt seen last week in clinic -- diarrhea post [place] trip  Pt seen in ED 1/29-- for continues diarrhea and abd pain --- got IVF per pt   Pt states the abd pain went away for a couple days --- then returned  Still having abd pain -- not improving --- has pain around passing diarrhea and passing gas ---   Diarrhea 3x/day = no blood   Pain and passing gas about Q 1 hr   Pt not eating much - concerned may cause more diarrhea and abd pain   No fevers  Reviewed home care advice     Pt states she has been sick x 9 days and requesting advice  Should she take probiotics?  Is there anything else she she be taking / eating / doing?      Please advise     Jen Zamarripa RN  [facility]  Triage Nurse Advisor    COVID 19 Nurse Triage Plan/Patient Instructions    Please be aware that novel coronavirus (COVID-19) may be circulating in the community. If you develop symptoms such as fever, cough, or SOB or if you have concerns about the presence of another infection including coronavirus (COVID-19), please contact your health care provider or visit [email or web address].     Disposition/Instructions    Home care recommended. Follow home care protocol based instructions.    Thank you for taking steps to prevent the spread of this virus.  o Limit your contact with others.  o Wear a simple mask to cover your cough.  o Wash your hands well and often.    Resources    [facility]: About COVID-19: [email or web address]    CDC: What to Do If You're Sick: www.cdc.gov/coronavirus/2019-ncov/about/steps-when-sick.html    CDC: Ending Home Isolation: www.cdc.gov/coronavirus/2019-ncov/hcp/disposition-in-home-patients.html     CDC: Caring for Someone: www.cdc.gov/coronavirus/2019-ncov/if-you-are-sick/care-for-someone.html     [facility]: Interim Guidance for Hospital Discharge to Home: www.health.[facility].[place]/diseases/coronavirus/hcp/hospdischarge.pdf    [facility] clinical trials (COVID-19 research  studies): [email or web address]-[email or web address]-[email or web address]     Below are the COVID-19 hotlines at the [place] [facility] ([facility]). Interpreters are available.   o For health questions: Call 634-082-7827 or 1-107.464.5208 (7 a.m. to 7 p.m.)  o For questions about schools and childcare: Call 454-014-7812 or 1-378.896.5608 (7 a.m. to 7 p.m.)             Reason for Disposition    Travel to a foreign country in past month    Additional Information    Negative: Shock suspected (e.g., cold/pale/clammy skin, too weak to stand, low BP, rapid pulse)    Negative: Difficult to awaken or acting confused (e.g., disoriented, slurred speech)    Negative: Sounds like a life-threatening emergency to the triager    Negative: Vomiting also present and worse than the diarrhea    Negative: Blood in stool and without diarrhea    Negative: SEVERE abdominal pain (e.g., excruciating) and present > 1 hour    Negative: SEVERE abdominal pain and age > 60    Negative: Bloody, black, or tarry bowel movements (Exception: chronic-unchanged black-grey bowel movements and is taking iron pills or Pepto-bismol)    Negative: SEVERE diarrhea (e.g., 7 or more times / day more than normal) and age > 60 years    Negative: Constant abdominal pain lasting > 2 hours    Negative: Drinking very little and has signs of dehydration (e.g., no urine > 12 hours, very dry mouth, very lightheaded)    Negative: Patient sounds very sick or weak to the triager    Negative: SEVERE diarrhea (e.g., 7 or more times / day more than normal) and present > 24 hours (1 day)    Negative: MODERATE diarrhea (e.g., 4-6 times / day more than normal) and present > 48 hours (2 days)    Negative: Abdominal pain  (Exception: pain clears completely with each passage of diarrhea stool)    Negative: Fever > 101 F (38.3 C)    Negative: Blood in the stool    Negative: Mucus or pus in stool has been present > 2 days and diarrhea is more than mild    Negative: Weak immune system (e.g., HIV  positive, cancer chemo, splenectomy, organ transplant, chronic steroids)    Protocols used: DIARRHEA-A-[place]

## 2022-02-03 NOTE — TELEPHONE ENCOUNTER
I do not have additional recommendations.  If she continues to be this ill she should go to the emergency room for IV fluids again.  She may need to be referred to ESTEPHANIA Escalante DO

## 2022-02-03 NOTE — TELEPHONE ENCOUNTER
Called and let patient know regarding below message.  She says she feels good when she doesn't eat.  Try and eat BRAT diet -small amounts every few hours, drink plenty of fluids to avoid dehydration.  If more than 6 loose stool along with headache should go to ED for IV fluid therapy.  Ok to take probiotic and vitamins.    Teresa Bravo RN

## 2022-02-03 NOTE — TELEPHONE ENCOUNTER
Routing to Dr. Escalante to review/advise    ADS for fluids?    Patient wondering about probiotic<    Any further advice?    Patient triaged again today.    Continues with diarrhea and abdominal pain.    Not eating much.    Felciiano Blackmon, RN, BSN, PHN  [facility]

## 2022-02-09 LAB
C COLI+JEJUNI+LARI FUSA STL QL NAA+PROBE: NOT DETECTED
EC STX1 GENE STL QL NAA+PROBE: DETECTED
EC STX2 GENE STL QL NAA+PROBE: NOT DETECTED
NOROV GI+II ORF1-ORF2 JNC STL QL NAA+PR: NOT DETECTED
RVA NSP5 STL QL NAA+PROBE: NOT DETECTED
SALMONELLA SP RPOD STL QL NAA+PROBE: NOT DETECTED
SHIGELLA SP+EIEC IPAH STL QL NAA+PROBE: NOT DETECTED
V CHOL+PARA RFBL+TRKH+TNAA STL QL NAA+PR: NOT DETECTED
Y ENTERO RECN STL QL NAA+PROBE: NOT DETECTED

## 2022-08-28 ENCOUNTER — HEALTH MAINTENANCE LETTER (OUTPATIENT)
Age: 22
End: 2022-08-28

## 2022-11-28 ENCOUNTER — NURSE TRIAGE (OUTPATIENT)
Dept: NURSING | Facility: CLINIC | Age: 22
End: 2022-11-28

## 2022-11-28 NOTE — TELEPHONE ENCOUNTER
Nurse Triage SBAR    Is this a 2nd Level Triage? YES, LICENSED PRACTITIONER REVIEW IS REQUIRED    Situation: Widespread hives, eye swelling    Background: Patient states that she woke up yesterday with swollen eyes and hives all over her body that were itchy. She states that it kind of went away but is calling this morning because she has broke out in hives again and they are itchy. She denies difficulty breathing, denies tongue swelling, denies throat itching.     Assessment: Probable allergic reaction    Protocol Recommended Disposition:   See in Office Today    Recommendation:     Patient will take a benadryl and go to [facility] or urgent care now.      Not routed     ALISSA HYLTON RN      Does the patient meet one of the following criteria for ADS visit consideration? 16+ years old, with an [facility] PCP     TIP  Providers, please consider if this condition is appropriate for management at one of our Acute and Diagnostic Services sites.     If patient is a good candidate, please use dotphrase <dot>triageresponse and select Refer to ADS to document.    Reason for Disposition    MODERATE-SEVERE hives persist (i.e., hives interfere with normal activities or work) and taking antihistamine (e.g., Benadryl, Claritin) > 24 hours    Additional Information    Negative: Difficulty breathing or wheezing now    Negative: Rapid onset of swollen tongue    Negative: Rapid onset of hoarseness or cough    Negative: Very weak (can't stand)    Negative: Difficult to awaken or acting confused (e.g., disoriented, slurred speech)    Negative: Life-threatening reaction (anaphylaxis) in the past to similar substance (e.g., food, insect bite/sting, chemical, etc.) and < 2 hours since exposure    Negative: Sounds like a life-threatening emergency to the triager    Negative: Bee, wasp, or yellow jacket sting within last 24 hours    Negative: Taking a new medicine now or within last 3 days  (Exception: Antihistamine, decongestant or other OTC  cough/cold medicines.)    Negative: Doesn't match the SYMPTOMS of hives    Negative: Swollen tongue    Negative: Widespread hives, itching, or facial swelling and onset < 2 hours of exposure to high-risk allergen (e.g., 1st dose of antibiotic, nuts, sting)    Negative: Patient sounds very sick or weak to the triager    Protocols used: HIVES-A-[place]

## 2023-01-14 ENCOUNTER — HEALTH MAINTENANCE LETTER (OUTPATIENT)
Age: 23
End: 2023-01-14

## 2023-06-26 ENCOUNTER — TELEPHONE (OUTPATIENT)
Dept: OBGYN | Facility: CLINIC | Age: 23
End: 2023-06-26
Payer: COMMERCIAL

## 2023-06-26 NOTE — TELEPHONE ENCOUNTER
Pt has some questions about her iud symptoms that she's been having. Please call her back to discuss

## 2023-09-30 ENCOUNTER — HEALTH MAINTENANCE LETTER (OUTPATIENT)
Age: 23
End: 2023-09-30

## 2024-11-17 ENCOUNTER — HEALTH MAINTENANCE LETTER (OUTPATIENT)
Age: 24
End: 2024-11-17

## 2025-05-08 ENCOUNTER — TELEPHONE (OUTPATIENT)
Dept: INTERNAL MEDICINE | Facility: CLINIC | Age: 25
End: 2025-05-08
Payer: COMMERCIAL

## 2025-05-08 NOTE — TELEPHONE ENCOUNTER
"Patient asked about the duration of her Mirena IUD. Patient states she has had the IUD placed for over 5 years. Writer advised this type of IUD can typically last up to 8 years without complications. Patient confirmed there are no complications with her IUD. However, the patient asked for provider advice on advisable time to remove the IUD before the 8 year lacie, because \"up to 8 years\" is not a specific time frame. Routing to provider for answer. Patient states she wishes to be called back with response on her mobile number [phone].   "